# Patient Record
Sex: FEMALE | Race: WHITE | NOT HISPANIC OR LATINO | Employment: FULL TIME | URBAN - METROPOLITAN AREA
[De-identification: names, ages, dates, MRNs, and addresses within clinical notes are randomized per-mention and may not be internally consistent; named-entity substitution may affect disease eponyms.]

---

## 2020-07-29 ENCOUNTER — LAB (OUTPATIENT)
Dept: FAMILY MEDICINE CLINIC | Facility: CLINIC | Age: 25
End: 2020-07-29

## 2020-07-29 ENCOUNTER — OFFICE VISIT (OUTPATIENT)
Dept: FAMILY MEDICINE CLINIC | Facility: CLINIC | Age: 25
End: 2020-07-29

## 2020-07-29 VITALS
TEMPERATURE: 97.5 F | BODY MASS INDEX: 23.4 KG/M2 | DIASTOLIC BLOOD PRESSURE: 75 MMHG | OXYGEN SATURATION: 98 % | HEIGHT: 69 IN | HEART RATE: 82 BPM | SYSTOLIC BLOOD PRESSURE: 120 MMHG | WEIGHT: 158 LBS

## 2020-07-29 DIAGNOSIS — Z01.419 ROUTINE GYNECOLOGICAL EXAMINATION: ICD-10-CM

## 2020-07-29 DIAGNOSIS — Z01.419 ROUTINE GYNECOLOGICAL EXAMINATION: Primary | ICD-10-CM

## 2020-07-29 LAB
ALBUMIN SERPL-MCNC: 4.4 G/DL (ref 3.5–5.2)
ALBUMIN/GLOB SERPL: 1.7 G/DL
ALP SERPL-CCNC: 48 U/L (ref 39–117)
ALT SERPL W P-5'-P-CCNC: 11 U/L (ref 1–33)
ANION GAP SERPL CALCULATED.3IONS-SCNC: 10.7 MMOL/L (ref 5–15)
AST SERPL-CCNC: 17 U/L (ref 1–32)
BASOPHILS # BLD AUTO: 0.06 10*3/MM3 (ref 0–0.2)
BASOPHILS NFR BLD AUTO: 1.5 % (ref 0–1.5)
BILIRUB SERPL-MCNC: 0.2 MG/DL (ref 0–1.2)
BUN SERPL-MCNC: 7 MG/DL (ref 6–20)
BUN/CREAT SERPL: 8.1 (ref 7–25)
CALCIUM SPEC-SCNC: 9 MG/DL (ref 8.6–10.5)
CHLORIDE SERPL-SCNC: 104 MMOL/L (ref 98–107)
CHOLEST SERPL-MCNC: 132 MG/DL (ref 0–200)
CO2 SERPL-SCNC: 23.3 MMOL/L (ref 22–29)
CREAT SERPL-MCNC: 0.86 MG/DL (ref 0.57–1)
DEPRECATED RDW RBC AUTO: 42.3 FL (ref 37–54)
EOSINOPHIL # BLD AUTO: 0.06 10*3/MM3 (ref 0–0.4)
EOSINOPHIL NFR BLD AUTO: 1.5 % (ref 0.3–6.2)
ERYTHROCYTE [DISTWIDTH] IN BLOOD BY AUTOMATED COUNT: 13.6 % (ref 12.3–15.4)
GFR SERPL CREATININE-BSD FRML MDRD: 80 ML/MIN/1.73
GLOBULIN UR ELPH-MCNC: 2.6 GM/DL
GLUCOSE SERPL-MCNC: 77 MG/DL (ref 65–99)
HCT VFR BLD AUTO: 36.1 % (ref 34–46.6)
HDLC SERPL-MCNC: 44 MG/DL (ref 40–60)
HGB BLD-MCNC: 11.9 G/DL (ref 12–15.9)
IMM GRANULOCYTES # BLD AUTO: 0.01 10*3/MM3 (ref 0–0.05)
IMM GRANULOCYTES NFR BLD AUTO: 0.3 % (ref 0–0.5)
LDLC SERPL CALC-MCNC: 68 MG/DL (ref 0–100)
LDLC/HDLC SERPL: 1.55 {RATIO}
LYMPHOCYTES # BLD AUTO: 1.37 10*3/MM3 (ref 0.7–3.1)
LYMPHOCYTES NFR BLD AUTO: 35 % (ref 19.6–45.3)
MCH RBC QN AUTO: 28.5 PG (ref 26.6–33)
MCHC RBC AUTO-ENTMCNC: 33 G/DL (ref 31.5–35.7)
MCV RBC AUTO: 86.6 FL (ref 79–97)
MONOCYTES # BLD AUTO: 0.34 10*3/MM3 (ref 0.1–0.9)
MONOCYTES NFR BLD AUTO: 8.7 % (ref 5–12)
NEUTROPHILS NFR BLD AUTO: 2.07 10*3/MM3 (ref 1.7–7)
NEUTROPHILS NFR BLD AUTO: 53 % (ref 42.7–76)
NRBC BLD AUTO-RTO: 0 /100 WBC (ref 0–0.2)
PLATELET # BLD AUTO: 327 10*3/MM3 (ref 140–450)
PMV BLD AUTO: 10.5 FL (ref 6–12)
POTASSIUM SERPL-SCNC: 3.7 MMOL/L (ref 3.5–5.2)
PROT SERPL-MCNC: 7 G/DL (ref 6–8.5)
RBC # BLD AUTO: 4.17 10*6/MM3 (ref 3.77–5.28)
SODIUM SERPL-SCNC: 138 MMOL/L (ref 136–145)
TRIGL SERPL-MCNC: 99 MG/DL (ref 0–150)
VLDLC SERPL-MCNC: 19.8 MG/DL (ref 5–40)
WBC # BLD AUTO: 3.91 10*3/MM3 (ref 3.4–10.8)

## 2020-07-29 PROCEDURE — 99395 PREV VISIT EST AGE 18-39: CPT | Performed by: NURSE PRACTITIONER

## 2020-07-29 PROCEDURE — 80053 COMPREHEN METABOLIC PANEL: CPT | Performed by: NURSE PRACTITIONER

## 2020-07-29 PROCEDURE — 85025 COMPLETE CBC W/AUTO DIFF WBC: CPT | Performed by: NURSE PRACTITIONER

## 2020-07-29 PROCEDURE — 80061 LIPID PANEL: CPT | Performed by: NURSE PRACTITIONER

## 2020-07-29 PROCEDURE — 36415 COLL VENOUS BLD VENIPUNCTURE: CPT

## 2020-07-29 RX ORDER — TRETINOIN 0.25 MG/G
GEL TOPICAL
COMMUNITY
Start: 2017-09-21

## 2020-07-29 NOTE — PROGRESS NOTES
Subjective   Neida Hernández is a 25 y.o. female.       HPI   Pt. Is here today for a routine GYN exam.  Pt. has not had a pap in the past.  LMP was 3 weeks ago and normal.  Not currently sexually active; not on or using any birth control.   Medical/social/family hx reviewed.    Pt. Denies any congestion; ear pain; throat pain; fevers.  Denies any CP; palpitations; SOA; dizziness; headache; trouble with vision.  Appetite is normal.  Denies any abdominal pain, N/V/D or constipation.  No urinary concerns.  Sleep is normal.  Denies any concern with depression or anxiety.         The following portions of the patient's history were reviewed and updated as appropriate: allergies, current medications, past family history, past medical history, past social history, past surgical history and problem list.    Review of Systems   Constitutional: Negative for activity change, appetite change, chills, diaphoresis, fatigue, fever, unexpected weight gain and unexpected weight loss.   HENT: Negative for congestion, dental problem, ear discharge, ear pain, postnasal drip, rhinorrhea, sinus pressure, sneezing, sore throat, swollen glands and trouble swallowing.    Eyes: Negative for blurred vision, double vision and visual disturbance.   Respiratory: Negative for cough, chest tightness, shortness of breath and wheezing.    Cardiovascular: Negative for chest pain, palpitations and leg swelling.   Gastrointestinal: Negative for abdominal distention, abdominal pain, blood in stool, constipation, diarrhea, nausea, vomiting and indigestion.   Endocrine: Negative for polydipsia, polyphagia and polyuria.   Genitourinary: Negative for amenorrhea, breast discharge, breast lump, breast pain, decreased urine volume, difficulty urinating, dysuria, flank pain, frequency, genital sores, hematuria, menstrual problem, pelvic pain, pelvic pressure, urgency, vaginal bleeding, vaginal discharge and vaginal pain.   Musculoskeletal: Negative for  arthralgias, back pain, joint swelling and myalgias.   Skin: Negative for rash and skin lesions.   Neurological: Negative for dizziness, weakness, light-headedness, headache and confusion.   Hematological: Negative for adenopathy.   Psychiatric/Behavioral: Negative for depressed mood. The patient is not nervous/anxious.        Objective   Physical Exam   Constitutional: She is oriented to person, place, and time. She appears well-developed and well-nourished. No distress.   HENT:   Head: Normocephalic and atraumatic.   Right Ear: External ear normal.   Left Ear: External ear normal.   Nose: Nose normal.   Mouth/Throat: Oropharynx is clear and moist.   Eyes: Pupils are equal, round, and reactive to light. Conjunctivae are normal.   Neck: Normal range of motion. Neck supple. No JVD present. No thyromegaly present.   Cardiovascular: Normal rate, regular rhythm, normal heart sounds and intact distal pulses.   No murmur heard.  Pulmonary/Chest: Effort normal and breath sounds normal. No respiratory distress. She has no wheezes. She exhibits no tenderness.   Abdominal: Soft. Bowel sounds are normal. She exhibits no distension and no mass. There is no tenderness. There is no rebound and no guarding. No hernia.   Genitourinary: Rectum normal, vagina normal and uterus normal. No labial fusion. There is no rash, tenderness, lesion or injury on the right labia. There is no rash, tenderness, lesion or injury on the left labia. Cervix exhibits no motion tenderness, no discharge and no friability. Right adnexum displays no mass, no tenderness and no fullness. Left adnexum displays no mass, no tenderness and no fullness.   Musculoskeletal: She exhibits no edema, tenderness or deformity.   Lymphadenopathy:     She has no cervical adenopathy.   Neurological: She is alert and oriented to person, place, and time. No cranial nerve deficit.   Skin: Skin is warm and dry. Capillary refill takes less than 2 seconds. No rash noted. No  erythema.   Psychiatric: She has a normal mood and affect.   Vitals reviewed.        Assessment/Plan   Neida was seen today for annual exam.    Diagnoses and all orders for this visit:    Routine gynecological examination  Comments:  Medical/social/family hx reviewed.   Labs today.   Pap today.   HPV vaccine info handout given in AVS  Orders:  -     IGP,Aptima HPV,Age Gdln; Future  -     Comprehensive metabolic panel; Future  -     Lipid panel; Future  -     CBC w AUTO Differential; Future  -     IGP,Aptima HPV,Age Gdln      Overall well-being, sleep habits and weight goals, can be improved by maintaining healthy diet and regular physical activity. Recommended regular dental care, vision care, consistent seatbelt use, and skin protection.

## 2020-07-29 NOTE — PATIENT INSTRUCTIONS
Health Maintenance, Female  Adopting a healthy lifestyle and getting preventive care are important in promoting health and wellness. Ask your health care provider about:  · The right schedule for you to have regular tests and exams.  · Things you can do on your own to prevent diseases and keep yourself healthy.  What should I know about diet, weight, and exercise?  Eat a healthy diet    · Eat a diet that includes plenty of vegetables, fruits, low-fat dairy products, and lean protein.  · Do not eat a lot of foods that are high in solid fats, added sugars, or sodium.  Maintain a healthy weight  Body mass index (BMI) is used to identify weight problems. It estimates body fat based on height and weight. Your health care provider can help determine your BMI and help you achieve or maintain a healthy weight.  Get regular exercise  Get regular exercise. This is one of the most important things you can do for your health. Most adults should:  · Exercise for at least 150 minutes each week. The exercise should increase your heart rate and make you sweat (moderate-intensity exercise).  · Do strengthening exercises at least twice a week. This is in addition to the moderate-intensity exercise.  · Spend less time sitting. Even light physical activity can be beneficial.  Watch cholesterol and blood lipids  Have your blood tested for lipids and cholesterol at 20 years of age, then have this test every 5 years.  Have your cholesterol levels checked more often if:  · Your lipid or cholesterol levels are high.  · You are older than 40 years of age.  · You are at high risk for heart disease.  What should I know about cancer screening?  Depending on your health history and family history, you may need to have cancer screening at various ages. This may include screening for:  · Breast cancer.  · Cervical cancer.  · Colorectal cancer.  · Skin cancer.  · Lung cancer.  What should I know about heart disease, diabetes, and high blood  pressure?  Blood pressure and heart disease  · High blood pressure causes heart disease and increases the risk of stroke. This is more likely to develop in people who have high blood pressure readings, are of  descent, or are overweight.  · Have your blood pressure checked:  ? Every 3-5 years if you are 18-39 years of age.  ? Every year if you are 40 years old or older.  Diabetes  Have regular diabetes screenings. This checks your fasting blood sugar level. Have the screening done:  · Once every three years after age 40 if you are at a normal weight and have a low risk for diabetes.  · More often and at a younger age if you are overweight or have a high risk for diabetes.  What should I know about preventing infection?  Hepatitis B  If you have a higher risk for hepatitis B, you should be screened for this virus. Talk with your health care provider to find out if you are at risk for hepatitis B infection.  Hepatitis C  Testing is recommended for:  · Everyone born from 1945 through 1965.  · Anyone with known risk factors for hepatitis C.  Sexually transmitted infections (STIs)  · Get screened for STIs, including gonorrhea and chlamydia, if:  ? You are sexually active and are younger than 24 years of age.  ? You are older than 24 years of age and your health care provider tells you that you are at risk for this type of infection.  ? Your sexual activity has changed since you were last screened, and you are at increased risk for chlamydia or gonorrhea. Ask your health care provider if you are at risk.  · Ask your health care provider about whether you are at high risk for HIV. Your health care provider may recommend a prescription medicine to help prevent HIV infection. If you choose to take medicine to prevent HIV, you should first get tested for HIV. You should then be tested every 3 months for as long as you are taking the medicine.  Pregnancy  · If you are about to stop having your period (premenopausal) and  you may become pregnant, seek counseling before you get pregnant.  · Take 400 to 800 micrograms (mcg) of folic acid every day if you become pregnant.  · Ask for birth control (contraception) if you want to prevent pregnancy.  Osteoporosis and menopause  Osteoporosis is a disease in which the bones lose minerals and strength with aging. This can result in bone fractures. If you are 65 years old or older, or if you are at risk for osteoporosis and fractures, ask your health care provider if you should:  · Be screened for bone loss.  · Take a calcium or vitamin D supplement to lower your risk of fractures.  · Be given hormone replacement therapy (HRT) to treat symptoms of menopause.  Follow these instructions at home:  Lifestyle  · Do not use any products that contain nicotine or tobacco, such as cigarettes, e-cigarettes, and chewing tobacco. If you need help quitting, ask your health care provider.  · Do not use street drugs.  · Do not share needles.  · Ask your health care provider for help if you need support or information about quitting drugs.  Alcohol use  · Do not drink alcohol if:  ? Your health care provider tells you not to drink.  ? You are pregnant, may be pregnant, or are planning to become pregnant.  · If you drink alcohol:  ? Limit how much you use to 0-1 drink a day.  ? Limit intake if you are breastfeeding.  · Be aware of how much alcohol is in your drink. In the [place] one drink equals one 12 oz bottle of beer (355 mL), one 5 oz glass of wine (148 mL), or one 1½ oz glass of hard liquor (44 mL).  General instructions  · Schedule regular health, dental, and eye exams.  · Stay current with your vaccines.  · Tell your health care provider if:  ? You often feel depressed.  ? You have ever been abused or do not feel safe at home.  Summary  · Adopting a healthy lifestyle and getting preventive care are important in promoting health and wellness.  · Follow your health care provider's instructions about healthy  diet, exercising, and getting tested or screened for diseases.  · Follow your health care provider's instructions on monitoring your cholesterol and blood pressure.  This information is not intended to replace advice given to you by your health care provider. Make sure you discuss any questions you have with your health care provider.  Document Released: 07/02/2012 Document Revised: 12/11/2019 Document Reviewed: 12/11/2019  [employer] Patient Education © 2020 [employer]    HPV Vaccine Information for Parents    HPV (human papillomavirus) is a common virus that spreads from person to person through sexual contact. It can spread during vaginal, anal, or oral sex. There are many types of HPV viruses, and some may cause cancer.  Your child can get a vaccination to prevent HPV infection and cancer. The vaccine is both safe and effective. It is recommended for boys and girls at about 11-12 years of age. Getting the vaccination at this age--before becoming sexually active--gives your child the best chance at protection from HPV infection through adulthood.  How can HPV affect my child?  HPV infection can cause:  · Genital warts.  · Mouth or throat cancer (oropharyngeal cancer).  · Anal cancer.  · Cervical, vulvar, or vaginal cancer.  · Penile cancer.  During pregnancy, HPV infection can be passed to the baby. This infection can cause warts to develop in a baby's throat and windpipe.  What actions can I take to lower my child's risk for HPV?  To lower your child's risk for HPV infection, have him or her get the HPV vaccination before becoming sexually active. The best time for vaccination is between ages 11 and 12, though it can be given to children as young as 9 years old. If your child gets the first dose before age 15, the vaccination can be given as 2 shots (doses), 6-12 months apart. In some situations, 3 doses are needed:  · If your child starts the vaccine before age 15 but does not have a second dose within 6-12  months, your child will need 3 doses to complete the vaccination. When your child has the first dose, it is important to make an appointment for the next shot and keep the appointment.  · Teens who are not vaccinated before age 15 will need 3 doses given within 6 months.  · If your child has a weak immune system, he or she may need 3 doses.  Young adults can also get the vaccination, even if they are already sexually active and even if they have already been infected with HPV. The vaccination can still help prevent the types of cancer-causing HPV that a person has not been infected with.  What are the risks and benefits of the HPV vaccine?  Benefits  The main benefit of getting vaccinated is to prevent certain cancers, including:  · Cervical, vulvar, and vaginal cancer in females.  · Penile cancer in males.  · Oral and anal cancer in both males and females.  The risk of these cancers is lower if your child gets vaccinated before he or she becomes sexually active.  The vaccine also prevents genital warts caused by HPV.  Risks  The risks, although low, include side effects or reactions to the vaccine. Very few reactions have been reported, but they can include:  · Soreness, redness, or swelling at the injection site.  · Dizziness or headache.  · Fever.  Who should not get the HPV vaccine or should wait to get it?  Some children should not get the HPV vaccine or should wait. Discuss the risks and benefits of the vaccine with your child's health care provider if your child:  · Has had a severe allergic reaction to other vaccinations.  · Is allergic to yeast.  · Has a fever.  · Has had a recent illness.  · Is pregnant or may be pregnant.  Where to find more information  · Centers for Disease Control and Prevention: cdc.gov/hpv  · American Academy of Pediatrics: healthychildren.org  Summary  · HPV (human papillomavirus) is a common virus that spreads from person to person through sexual contact. It can spread during  vaginal, anal, or oral sex.  · Your child can get a vaccination to prevent HPV infection and cancer. It is best to get the vaccination before becoming sexually active.  · The HPV vaccine can protect your child from genital warts and certain types of cancer, including cancer of the cervix, throat, mouth, vulva, vagina, anus, and penis.  · The HPV vaccine is both safe and effective.  · The best time for boys and girls to get the vaccination is when they are between ages 11 and 12.  This information is not intended to replace advice given to you by your health care provider. Make sure you discuss any questions you have with your health care provider.  Document Released: 03/07/2019 Document Revised: 05/06/2019 Document Reviewed: 03/07/2019  [employer] Patient Education © 2020 [employer]

## 2020-08-03 LAB
AGE GDLN ACOG TESTING: NORMAL
CHROM ANALY OVERALL INTERP-IMP: NORMAL
CONV .: NORMAL
CONV .: NORMAL
CONV PERFORMED BY:: NORMAL
DX ICD CODE: NORMAL
HIV 1 & 2 AB SER-IMP: NORMAL
Lab: NORMAL
REF LAB TEST METHOD: NORMAL
STAT OF ADQ CVX/VAG CYTO-IMP: NORMAL

## 2024-07-08 ENCOUNTER — OFFICE VISIT (OUTPATIENT)
Dept: FAMILY MEDICINE CLINIC | Facility: CLINIC | Age: 29
End: 2024-07-08
Payer: COMMERCIAL

## 2024-07-08 ENCOUNTER — LAB (OUTPATIENT)
Dept: FAMILY MEDICINE CLINIC | Facility: CLINIC | Age: 29
End: 2024-07-08
Payer: COMMERCIAL

## 2024-07-08 VITALS
BODY MASS INDEX: 22.82 KG/M2 | RESPIRATION RATE: 14 BRPM | SYSTOLIC BLOOD PRESSURE: 110 MMHG | HEIGHT: 70 IN | OXYGEN SATURATION: 100 % | WEIGHT: 159.38 LBS | DIASTOLIC BLOOD PRESSURE: 60 MMHG | HEART RATE: 107 BPM

## 2024-07-08 DIAGNOSIS — R21 ACUTE MACULOPAPULAR RASH: ICD-10-CM

## 2024-07-08 DIAGNOSIS — B37.9 CANDIDIASIS: ICD-10-CM

## 2024-07-08 DIAGNOSIS — Z01.419 ENCOUNTER FOR ANNUAL ROUTINE GYNECOLOGICAL EXAMINATION: ICD-10-CM

## 2024-07-08 DIAGNOSIS — Z00.00 ENCOUNTER FOR ANNUAL PHYSICAL EXAM: ICD-10-CM

## 2024-07-08 DIAGNOSIS — Z00.00 ENCOUNTER FOR ANNUAL PHYSICAL EXAM: Primary | ICD-10-CM

## 2024-07-08 DIAGNOSIS — Z23 IMMUNIZATION DUE: ICD-10-CM

## 2024-07-08 LAB
C TRACH RRNA CVX QL NAA+PROBE: NOT DETECTED
N GONORRHOEA RRNA SPEC QL NAA+PROBE: NOT DETECTED
TRICHOMONAS VAGINALIS PCR: NOT DETECTED

## 2024-07-08 PROCEDURE — 87491 CHLMYD TRACH DNA AMP PROBE: CPT | Performed by: STUDENT IN AN ORGANIZED HEALTH CARE EDUCATION/TRAINING PROGRAM

## 2024-07-08 PROCEDURE — 87591 N.GONORRHOEAE DNA AMP PROB: CPT | Performed by: STUDENT IN AN ORGANIZED HEALTH CARE EDUCATION/TRAINING PROGRAM

## 2024-07-08 PROCEDURE — 87661 TRICHOMONAS VAGINALIS AMPLIF: CPT | Performed by: STUDENT IN AN ORGANIZED HEALTH CARE EDUCATION/TRAINING PROGRAM

## 2024-07-08 RX ORDER — FLUCONAZOLE 150 MG/1
150 TABLET ORAL ONCE
Qty: 2 TABLET | Refills: 0 | Status: SHIPPED | OUTPATIENT
Start: 2024-07-08 | End: 2024-07-08

## 2024-07-08 NOTE — PROGRESS NOTES
[facility]  New Patient Visit  07/08/2024     Patient Name: Neida Hernández   YOB: 1995   Medical Record Number: [id number]   Primary Care Physician: Allegra Gong MD       Subjective   Chief Complaint     Chief Complaint   Patient presents with    Establish Care       History of Present Illness     PMHx:  - Eczema: follows with dermatology, uses triamcinolone PRN  - Childhood Asthma: not currently active, no recent issues  - Acne: Takes tretinoin     Yeast infection  - Treated with doxycycline recently for eczema. Then developed vaginal discomfort and itching. Has persistent itching and burning, minimal discharge.     Rash  - Rash on both legs, down to knees, started 2 weeks ago after completing doxycycline course  - Also used Tavarez about a month ago and wasn't sure if this caused reaction  - Rash is itchy but not improving, no other associated symptoms  - Has eczema on arms, uses triamcinolone cream as needed      Preventative/HCM     DIET: healthy eating habits and balanced diet  EXERCISE: generally stays active (work/exercise)  MOOD: Denies depression symptoms (PHQ-2 screen is negative)  VISION/DENTAL: UTD, no concerns    SOCIAL HISTORY:  reports that she has never smoked. She has never used smokeless tobacco. She reports current alcohol use. She reports that she does not use drugs.  SEXUAL HISTORY:  reports being sexually active and has had partner(s) who are male. She reports using the following method of birth control/protection: None. Sexually active with one male partner. Inconsistent condom use. Would be okay with pregnancy in current relationship.    MENSTRUAL STATUS: premenopausal  Menstrual Cycles: regular, normal duration, and normal flow. LMP was 1 week ago.     IMMUNIZATIONS: Unsure of tetanus shot in last 10 years, likely received one 8 years ago after a cut. Agrees to receive tetanus booster today.    CANCER SCREENING:   Colon Cancer: No  "significant family history of colon cancer  Breast Cancer: Maternal grandmother had breast cancer, unsure of age at diagnosis. No mammograms to date.   Cervical Cancer: Last Pap smear was 4 years ago, normal results.         Review of Systems   A medically appropriate and patient-specific review of systems was performed. Pertinent findings are mentioned in the HPI, with no additional significant findings beyond those already noted.    Patient History   Personally reviewed patient's prior histories, current medications, and allergies and updated the chart as appropriate; pertinent information mentioned below:    History Review Comments   Past Medical History:   has a past medical history of Acne, mild (07/25/2014), Asthma (07/27/2024), Eczema, and Migraine headache (08/23/2017).   Past Surgical History:  has no past surgical history on file.   Family History: family history includes Alzheimer's disease in her maternal grandmother; Breast cancer in her paternal grandmother; Cancer in her paternal grandfather; Hypertension in her father; Lung cancer in her maternal grandfather; Stroke in her mother.   Social History:  reports that she has never smoked. She has never used smokeless tobacco. She reports current alcohol use. She reports that she does not use drugs.   Allergies Patient has no known allergies.         Objective   Vitals     Vitals:    07/08/24 1020   BP: 110/60   Pulse: 107   Resp: 14   SpO2: 100%   Weight: 72.3 kg (159 lb 6 oz)   Height: 177.8 cm (70\")      BMI Readings from Last 3 Encounters:   07/08/24 22.87 kg/m²   07/29/20 23.33 kg/m²   08/23/17 23.96 kg/m²     BMI is within normal parameters. No other follow-up for BMI required.      Physical Exam   Constitutional: Alert, well-appearing, no acute distress  Eyes: Vision grossly intact, sclerae anicteric, no conjunctival injection  HENT: NCAT, mucous membranes moist, normal dentition, posterior pharynx normal, bilateral TMs normal  Neck: Supple, no " thyromegaly, no lymphadenopathy, trachea midline  Respiratory: No respiratory distress, good effort and air entry, clear to auscultation bilaterally   Cardiovascular: RRR, no murmurs, normal peripheral perfusion, no LE edema  : Normal external genitalia, no lesions, discharge, or tenderness. Vaginal mucosa pink and moist. Cervix visualized, no lesions or discharge. Bimanual exam: Uterus normal size, shape, and position; no adnexal masses or tenderness. Pap smear obtained, no gross abnormalities observed  Musculoskeletal: Strength grossly intact, appropriate ROM in all extremities, no obvious deformities or injuries  Psychiatric: Appropriate mood and affect, cooperative  Neurologic: At baseline, motor and sensory function grossly intact, moves all extremities equally  Skin: Scattered erythematous maculopapular lesions on lower extremities, no secondary lesions or signs of infection        Assessment & Plan     Diagnoses and all orders for this visit:    Encounter for annual physical exam  Comments:  Age appropriate preventative screenings UTD. Pap smear perfomed today and was normal. Gonorrhea and chlamydia screening ordered today (no current high risk behaviors but patient sexually active & <24 years old). Counseled patient on diet, exercise, weight, vaccines, disease/screening prevention, safety, risk reduction, dental/vision care, and mental health.  Orders:  -     Chlamydia trachomatis, Neisseria gonorrhoeae, Trichomonas vaginalis, PCR - Urine, Urine, Clean Catch; Future    Encounter for annual routine gynecological examination  Comments:  Pap smear performed today. No abnormal findings. Results normal. Repeat pap in 3 years (due July 2027) with HPV.  Orders:  -     IGP,Aptima HPV,Age Gdln; Future    Immunization due  Comments:  Tetanus vaccine administered today  Orders:  -     Tdap Vaccine Greater Than or Equal To 8yo IM    Candidiasis  Comments:  Acute, uncomplicated  - Likely secondary to recent doxycycline  use  PLAN:  - Prescribe fluconazole 150mg PO x1 dose, may repeat x1 in 3 days if symptoms persist  -  on avoiding tight, non-breathable clothing  - Recommend follow-up if symptoms do not improve or worsen  Orders:  -     fluconazole (DIFLUCAN) 150 MG tablet; Take 1 tablet by mouth 1 (One) Time for 1 dose. May repeat dose in 72 hours if symptoms not resolved.    Acute maculopapular rash  Comments:  Acute, uncomplicated  - Likely secondary to recent doxycycline use vs irritant contact dermatitis from Tavarez  PLAN:  - Reassurance and advise to monitor  - May use topical triamcinolone as needed for itching  - Follow-up if rash persists beyond a few weeks or if other symptoms develop         Follow Up   Return in about 1 year (around 7/8/2025). For annual physical or sooner if concerns.     Patient was given instructions and counseling regarding her condition or for health maintenance advice. Please see specific information pulled into the AVS if appropriate.       This medical documentation was produced in part using speech recognition software ([employer]agon Dictation System) and may contain errors related to that system including errors in grammar, punctuation, and spelling, as well as words and phrases that may be inappropriate and not intentional --- If there are any questions or concerns please feel free to contact me, the dictating provider, for clarification.      Allegra Gong MD

## 2024-07-11 LAB
AGE GDLN ACOG TESTING: NORMAL
CONV .: NORMAL
CYTOLOGIST CVX/VAG CYTO: NORMAL
CYTOLOGY CVX/VAG DOC CYTO: NORMAL
CYTOLOGY CVX/VAG DOC THIN PREP: NORMAL
DX ICD CODE: NORMAL
Lab: NORMAL
OTHER STN SPEC: NORMAL
STAT OF ADQ CVX/VAG CYTO-IMP: NORMAL

## 2024-07-27 PROBLEM — G43.909 MIGRAINE HEADACHE: Status: RESOLVED | Noted: 2017-08-23 | Resolved: 2024-07-27

## 2024-07-27 PROBLEM — J45.909 ASTHMA: Status: RESOLVED | Noted: 2024-07-27 | Resolved: 2024-07-27

## 2024-07-27 PROBLEM — L30.9 ECZEMA: Status: ACTIVE | Noted: 2024-07-27
